# Patient Record
Sex: FEMALE | Race: WHITE | NOT HISPANIC OR LATINO
[De-identification: names, ages, dates, MRNs, and addresses within clinical notes are randomized per-mention and may not be internally consistent; named-entity substitution may affect disease eponyms.]

---

## 2024-08-28 ENCOUNTER — P2P PATIENT RECORD (OUTPATIENT)
Age: 64
End: 2024-08-28

## 2024-09-16 ENCOUNTER — DASHBOARD ENCOUNTERS (OUTPATIENT)
Age: 64
End: 2024-09-16

## 2024-09-16 ENCOUNTER — OFFICE VISIT (OUTPATIENT)
Dept: URBAN - METROPOLITAN AREA CLINIC 70 | Facility: CLINIC | Age: 64
End: 2024-09-16
Payer: COMMERCIAL

## 2024-09-16 ENCOUNTER — LAB OUTSIDE AN ENCOUNTER (OUTPATIENT)
Dept: URBAN - METROPOLITAN AREA CLINIC 70 | Facility: CLINIC | Age: 64
End: 2024-09-16

## 2024-09-16 VITALS
BODY MASS INDEX: 34.05 KG/M2 | SYSTOLIC BLOOD PRESSURE: 134 MMHG | DIASTOLIC BLOOD PRESSURE: 88 MMHG | TEMPERATURE: 98.4 F | HEIGHT: 63 IN | HEART RATE: 69 BPM | WEIGHT: 192.2 LBS

## 2024-09-16 DIAGNOSIS — Z12.11 ENCOUNTER FOR SCREENING FOR MALIGNANT NEOPLASM OF COLON: ICD-10-CM

## 2024-09-16 DIAGNOSIS — D50.8 ACHLORHYDRIC ANEMIA: ICD-10-CM

## 2024-09-16 PROBLEM — 87522002: Status: ACTIVE | Noted: 2024-09-16

## 2024-09-16 PROCEDURE — 99203 OFFICE O/P NEW LOW 30 MIN: CPT | Performed by: NURSE PRACTITIONER

## 2024-09-16 RX ORDER — ALBUTEROL SULFATE 108 UG/1
INHALE TWO PUFFS BY MOUTH EVERY 4 HOURS AEROSOL, METERED RESPIRATORY (INHALATION)
Qty: 6.7 UNSPECIFIED | Refills: 0 | Status: ACTIVE | COMMUNITY

## 2024-09-16 RX ORDER — ERGOCALCIFEROL 1.25 MG/1
TAKE ONE CAPSULE BY MOUTH EVERY WEEK AS DIRECTED FOR 90 DAYS CAPSULE, LIQUID FILLED ORAL
Qty: 13 UNSPECIFIED | Refills: 0 | Status: ACTIVE | COMMUNITY

## 2024-09-16 RX ORDER — ZOLPIDEM TARTRATE 10 MG/1
TAKE ONE TABLET BY MOUTH AT BEDTIME AS NEEDED FOR INSOMNIA TABLET ORAL
Qty: 30 UNSPECIFIED | Refills: 2 | Status: ACTIVE | COMMUNITY

## 2024-09-16 RX ORDER — LISINOPRIL AND HYDROCHLOROTHIAZIDE TABLETS 10; 12.5 MG/1; MG/1
TAKE ONE TABLET BY MOUTH ONE TIME DAILY TABLET ORAL
Qty: 90 UNSPECIFIED | Refills: 1 | Status: ACTIVE | COMMUNITY

## 2024-09-16 NOTE — HPI-TODAY'S VISIT:
09/16/24: PAtient presents for work up for ANABEL. S/P gastric bypass in 1999. Reports H/O ANABEL with mensuration that resolved after menopause and has recently returned. Denies any S/S of bleeding. 40 lb weight gain in the last couple of years.  08/20/24: Labs from [facility] HGB 12.3, HCT 38.3, MCV 86, , Ferritin 153, Iron 68, TIBC 288, UIBC 220, Iron Sat 24% - S/P iron infusion 07/16/2015: Colon - normal. 10 year recall

## 2024-10-15 ENCOUNTER — CLAIMS CREATED FROM THE CLAIM WINDOW (OUTPATIENT)
Dept: URBAN - METROPOLITAN AREA SURGERY CENTER 24 | Facility: SURGERY CENTER | Age: 64
End: 2024-10-15
Payer: COMMERCIAL

## 2024-10-15 ENCOUNTER — TELEPHONE ENCOUNTER (OUTPATIENT)
Dept: URBAN - METROPOLITAN AREA CLINIC 70 | Facility: CLINIC | Age: 64
End: 2024-10-15

## 2024-10-15 DIAGNOSIS — Z12.11 COLON CANCER SCREENING: ICD-10-CM

## 2024-10-15 DIAGNOSIS — K29.60 ADENOPAPILLOMATOSIS GASTRICA: ICD-10-CM

## 2024-10-15 DIAGNOSIS — K29.50 CHRONIC GASTRITIS WITHOUT BLEEDING, UNSPECIFIED GASTRITIS TYPE: ICD-10-CM

## 2024-10-15 DIAGNOSIS — D50.9 ANEMIA: ICD-10-CM

## 2024-10-15 DIAGNOSIS — D12.0 ADENOMA OF CECUM: ICD-10-CM

## 2024-10-15 DIAGNOSIS — D12.0 BENIGN NEOPLASM OF CECUM: ICD-10-CM

## 2024-10-15 DIAGNOSIS — K31.89 OTHER DISEASES OF STOMACH AND DUODENUM: ICD-10-CM

## 2024-10-15 PROCEDURE — 00813 ANES UPR LWR GI NDSC PX: CPT | Performed by: NURSE ANESTHETIST, CERTIFIED REGISTERED

## 2024-10-15 PROCEDURE — 45385 COLONOSCOPY W/LESION REMOVAL: CPT | Performed by: INTERNAL MEDICINE

## 2024-10-15 PROCEDURE — 43239 EGD BIOPSY SINGLE/MULTIPLE: CPT | Performed by: INTERNAL MEDICINE

## 2024-10-15 RX ORDER — PANTOPRAZOLE SODIUM 40 MG/1
1 TABLET 1/2 TO 1 HOUR BEFORE MORNING MEAL TABLET, DELAYED RELEASE ORAL ONCE A DAY
Qty: 90 TABLET | Refills: 0 | OUTPATIENT
Start: 2024-10-15

## 2024-10-15 RX ORDER — ZOLPIDEM TARTRATE 10 MG/1
TAKE ONE TABLET BY MOUTH AT BEDTIME AS NEEDED FOR INSOMNIA TABLET ORAL
Qty: 30 UNSPECIFIED | Refills: 2 | Status: ACTIVE | COMMUNITY

## 2024-10-15 RX ORDER — ALBUTEROL SULFATE 108 UG/1
INHALE TWO PUFFS BY MOUTH EVERY 4 HOURS AEROSOL, METERED RESPIRATORY (INHALATION)
Qty: 6.7 UNSPECIFIED | Refills: 0 | Status: ACTIVE | COMMUNITY

## 2024-10-15 RX ORDER — ERGOCALCIFEROL 1.25 MG/1
TAKE ONE CAPSULE BY MOUTH EVERY WEEK AS DIRECTED FOR 90 DAYS CAPSULE, LIQUID FILLED ORAL
Qty: 13 UNSPECIFIED | Refills: 0 | Status: ACTIVE | COMMUNITY

## 2024-10-15 RX ORDER — LISINOPRIL AND HYDROCHLOROTHIAZIDE TABLETS 10; 12.5 MG/1; MG/1
TAKE ONE TABLET BY MOUTH ONE TIME DAILY TABLET ORAL
Qty: 90 UNSPECIFIED | Refills: 1 | Status: ACTIVE | COMMUNITY

## 2024-11-18 ENCOUNTER — OFFICE VISIT (OUTPATIENT)
Dept: URBAN - METROPOLITAN AREA CLINIC 70 | Facility: CLINIC | Age: 64
End: 2024-11-18

## 2024-11-18 RX ORDER — LISINOPRIL AND HYDROCHLOROTHIAZIDE TABLETS 10; 12.5 MG/1; MG/1
TAKE ONE TABLET BY MOUTH ONE TIME DAILY TABLET ORAL
Qty: 90 UNSPECIFIED | Refills: 1 | COMMUNITY

## 2024-11-18 RX ORDER — ZOLPIDEM TARTRATE 10 MG/1
TAKE ONE TABLET BY MOUTH AT BEDTIME AS NEEDED FOR INSOMNIA TABLET ORAL
Qty: 30 UNSPECIFIED | Refills: 2 | COMMUNITY

## 2024-11-18 RX ORDER — ERGOCALCIFEROL 1.25 MG/1
TAKE ONE CAPSULE BY MOUTH EVERY WEEK AS DIRECTED FOR 90 DAYS CAPSULE, LIQUID FILLED ORAL
Qty: 13 UNSPECIFIED | Refills: 0 | COMMUNITY

## 2024-11-18 RX ORDER — PANTOPRAZOLE SODIUM 40 MG/1
1 TABLET 1/2 TO 1 HOUR BEFORE MORNING MEAL TABLET, DELAYED RELEASE ORAL ONCE A DAY
Qty: 90 TABLET | Refills: 0 | COMMUNITY
Start: 2024-10-15

## 2024-11-18 RX ORDER — ALBUTEROL SULFATE 108 UG/1
INHALE TWO PUFFS BY MOUTH EVERY 4 HOURS AEROSOL, METERED RESPIRATORY (INHALATION)
Qty: 6.7 UNSPECIFIED | Refills: 0 | COMMUNITY